# Patient Record
Sex: FEMALE | Race: BLACK OR AFRICAN AMERICAN | NOT HISPANIC OR LATINO | URBAN - METROPOLITAN AREA
[De-identification: names, ages, dates, MRNs, and addresses within clinical notes are randomized per-mention and may not be internally consistent; named-entity substitution may affect disease eponyms.]

---

## 2017-05-16 ENCOUNTER — OFFICE (OUTPATIENT)
Dept: URBAN - METROPOLITAN AREA CLINIC 11 | Facility: CLINIC | Age: 42
End: 2017-05-16
Payer: COMMERCIAL

## 2017-05-16 VITALS
SYSTOLIC BLOOD PRESSURE: 113 MMHG | RESPIRATION RATE: 16 BRPM | DIASTOLIC BLOOD PRESSURE: 69 MMHG | HEART RATE: 108 BPM | WEIGHT: 158 LBS | HEIGHT: 66 IN

## 2017-05-16 DIAGNOSIS — R13.10 DYSPHAGIA, UNSPECIFIED: ICD-10-CM

## 2017-05-16 PROCEDURE — 99202 OFFICE O/P NEW SF 15 MIN: CPT

## 2017-05-16 NOTE — SERVICEHPINOTES
this 41-year-old patient comes in telling me that whenever she eats the food seems to suggest lie in the upper abdomen.  In addition she has difficulty swallowing with food seemed to hang up high in her chest.  Importantly he she was diagnosed with breast cancer in August of 2016 but refused chemotherapy.  When she became more ill she accepted the idea of chemotherapy and since she has been on it she has had nausea vomiting and an increase in the symptoms noted above.  She claims that she can never have a bowel movement and must take laxatives frequently.  She is a smoker.

## 2017-05-18 ENCOUNTER — AMBULATORY SURGICAL CENTER (OUTPATIENT)
Dept: URBAN - METROPOLITAN AREA SURGERY 3 | Facility: SURGERY | Age: 42
End: 2017-05-18
Payer: COMMERCIAL

## 2017-05-18 VITALS
WEIGHT: 158 LBS | SYSTOLIC BLOOD PRESSURE: 127 MMHG | HEART RATE: 100 BPM | SYSTOLIC BLOOD PRESSURE: 127 MMHG | RESPIRATION RATE: 20 BRPM | HEART RATE: 100 BPM | HEIGHT: 66 IN | OXYGEN SATURATION: 96 % | OXYGEN SATURATION: 93 % | RESPIRATION RATE: 17 BRPM | TEMPERATURE: 98.2 F | TEMPERATURE: 97.2 F | DIASTOLIC BLOOD PRESSURE: 84 MMHG | TEMPERATURE: 97.2 F | SYSTOLIC BLOOD PRESSURE: 117 MMHG | OXYGEN SATURATION: 94 % | SYSTOLIC BLOOD PRESSURE: 136 MMHG | RESPIRATION RATE: 20 BRPM | OXYGEN SATURATION: 93 % | SYSTOLIC BLOOD PRESSURE: 143 MMHG | HEART RATE: 87 BPM | SYSTOLIC BLOOD PRESSURE: 117 MMHG | SYSTOLIC BLOOD PRESSURE: 101 MMHG | DIASTOLIC BLOOD PRESSURE: 87 MMHG | OXYGEN SATURATION: 94 % | DIASTOLIC BLOOD PRESSURE: 82 MMHG | RESPIRATION RATE: 17 BRPM | DIASTOLIC BLOOD PRESSURE: 84 MMHG | OXYGEN SATURATION: 100 % | DIASTOLIC BLOOD PRESSURE: 85 MMHG | WEIGHT: 158 LBS | RESPIRATION RATE: 18 BRPM | DIASTOLIC BLOOD PRESSURE: 85 MMHG | DIASTOLIC BLOOD PRESSURE: 82 MMHG | HEIGHT: 66 IN | OXYGEN SATURATION: 100 % | HEART RATE: 95 BPM | HEART RATE: 94 BPM | HEART RATE: 94 BPM | TEMPERATURE: 98.2 F | DIASTOLIC BLOOD PRESSURE: 87 MMHG | SYSTOLIC BLOOD PRESSURE: 136 MMHG | SYSTOLIC BLOOD PRESSURE: 143 MMHG | RESPIRATION RATE: 18 BRPM | HEART RATE: 87 BPM | OXYGEN SATURATION: 96 % | SYSTOLIC BLOOD PRESSURE: 101 MMHG | HEART RATE: 95 BPM

## 2017-05-18 DIAGNOSIS — R13.10 DYSPHAGIA, UNSPECIFIED: ICD-10-CM

## 2017-05-18 DIAGNOSIS — K22.2 ESOPHAGEAL OBSTRUCTION: ICD-10-CM

## 2017-05-18 DIAGNOSIS — K29.80 DUODENITIS WITHOUT BLEEDING: ICD-10-CM

## 2017-05-18 PROCEDURE — 43248 EGD GUIDE WIRE INSERTION: CPT | Performed by: INTERNAL MEDICINE

## 2017-06-06 ENCOUNTER — OFFICE (OUTPATIENT)
Dept: URBAN - METROPOLITAN AREA CLINIC 11 | Facility: CLINIC | Age: 42
End: 2017-06-06
Payer: COMMERCIAL

## 2017-06-06 VITALS
DIASTOLIC BLOOD PRESSURE: 76 MMHG | SYSTOLIC BLOOD PRESSURE: 127 MMHG | HEIGHT: 66 IN | HEART RATE: 92 BPM | RESPIRATION RATE: 16 BRPM | WEIGHT: 159 LBS

## 2017-06-06 DIAGNOSIS — R14.0 ABDOMINAL DISTENSION (GASEOUS): ICD-10-CM

## 2017-06-06 DIAGNOSIS — R13.10 DYSPHAGIA, UNSPECIFIED: ICD-10-CM

## 2017-06-06 PROCEDURE — 99213 OFFICE O/P EST LOW 20 MIN: CPT

## 2017-06-06 NOTE — SERVICENOTES
She probably should have been on PPIs before today so I will start than and see if that helps her present symptoms

## 2017-06-06 NOTE — SERVICEHPINOTES
approximately 1 month ago  did an upper endoscopy on her and found Schatzki ring and significant esophagitis.  She was dilated to a 16. Savary dilator and tells me that she has had no further problems swallowing.  She does occasionally have heartburn and feels full and bloated.  She is not on any PPI.  Presently she is just finished chemotherapy for breast carcinoma.  Unfortunately she does smoke but otherwise feels fine